# Patient Record
Sex: MALE | Race: WHITE | NOT HISPANIC OR LATINO | Employment: OTHER | URBAN - NONMETROPOLITAN AREA
[De-identification: names, ages, dates, MRNs, and addresses within clinical notes are randomized per-mention and may not be internally consistent; named-entity substitution may affect disease eponyms.]

---

## 2024-05-15 ENCOUNTER — OFFICE VISIT (OUTPATIENT)
Dept: CARDIOLOGY | Facility: CLINIC | Age: 69
End: 2024-05-15
Payer: MEDICARE

## 2024-05-15 VITALS
HEIGHT: 67 IN | BODY MASS INDEX: 32.11 KG/M2 | HEART RATE: 84 BPM | DIASTOLIC BLOOD PRESSURE: 65 MMHG | WEIGHT: 204.6 LBS | SYSTOLIC BLOOD PRESSURE: 135 MMHG | OXYGEN SATURATION: 97 %

## 2024-05-15 DIAGNOSIS — I25.10 CORONARY ARTERY DISEASE INVOLVING NATIVE CORONARY ARTERY OF NATIVE HEART WITHOUT ANGINA PECTORIS: ICD-10-CM

## 2024-05-15 DIAGNOSIS — R06.02 SHORTNESS OF BREATH: Primary | ICD-10-CM

## 2024-05-15 DIAGNOSIS — E78.2 MIXED HYPERLIPIDEMIA: ICD-10-CM

## 2024-05-15 PROCEDURE — 1159F MED LIST DOCD IN RCRD: CPT | Performed by: NURSE PRACTITIONER

## 2024-05-15 PROCEDURE — 99204 OFFICE O/P NEW MOD 45 MIN: CPT | Performed by: NURSE PRACTITIONER

## 2024-05-15 PROCEDURE — 93000 ELECTROCARDIOGRAM COMPLETE: CPT | Performed by: NURSE PRACTITIONER

## 2024-05-15 PROCEDURE — 1160F RVW MEDS BY RX/DR IN RCRD: CPT | Performed by: NURSE PRACTITIONER

## 2024-05-15 RX ORDER — BUPROPION HYDROCHLORIDE 300 MG/1
300 TABLET ORAL DAILY
COMMUNITY

## 2024-05-15 RX ORDER — ROSUVASTATIN CALCIUM 20 MG/1
20 TABLET, COATED ORAL DAILY
COMMUNITY

## 2024-05-15 RX ORDER — CELECOXIB 200 MG/1
200 CAPSULE ORAL DAILY
COMMUNITY

## 2024-05-15 RX ORDER — CHLORAL HYDRATE 500 MG
1000 CAPSULE ORAL
COMMUNITY

## 2024-05-15 RX ORDER — ASPIRIN 81 MG/1
81 TABLET ORAL DAILY
COMMUNITY

## 2024-05-15 RX ORDER — FLUTICASONE PROPIONATE 50 MCG
2 SPRAY, SUSPENSION (ML) NASAL DAILY
COMMUNITY

## 2024-05-15 RX ORDER — FAMOTIDINE 20 MG/1
20 TABLET, FILM COATED ORAL DAILY
COMMUNITY

## 2024-05-15 NOTE — PROGRESS NOTES
Subjective     Damon Rodriguez is a 68 y.o. male who presents to day for Coronary Artery Disease and Shortness of Breath.    CHIEF COMPLIANT  Chief Complaint   Patient presents with    Coronary Artery Disease    Shortness of Breath       Active Problems:  Problem List Items Addressed This Visit    None  Visit Diagnoses       Shortness of breath    -  Primary    Relevant Orders    ECG 12 Lead    Adult Transthoracic Echo Complete W/ Cont if Necessary Per Protocol    Coronary artery disease involving native coronary artery of native heart without angina pectoris        Relevant Orders    ECG 12 Lead    Adult Transthoracic Echo Complete W/ Cont if Necessary Per Protocol    Mixed hyperlipidemia        Relevant Medications    rosuvastatin (CRESTOR) 20 MG tablet    Other Relevant Orders    ECG 12 Lead    Adult Transthoracic Echo Complete W/ Cont if Necessary Per Protocol            HPI  HPI  Damon Rodriguez is a 68-year-old male patient is being seen today to establish care for cardiac evaluation.    Patient does have a history of coronary artery disease with the report of 4 different stents.  Is been about 2012 when he had his last workup in [facility] by Dr. Hidalgo which sounds to be a stress test.  He does not report any chest pain at this time for his past. He is on statin therapy of rosuvastatin and antiplatelet therapy of aspirin.    Patient does have shortness of breath that occurs with exertion.  He says walking up his driveway or working on his car does only do short of breath.  He denies any dyspnea at rest or any orthopnea.    Patient does have palpitations with intermittent fluttering and skipping like sensation that occurs in his chest.  He says it happens very infrequently about 1-2 times a month.  May last few seconds per episode.  He says he does notice something then its gone.    Patient does have fatigue in which she is tired all the time.  He has decreased endurance with was not able to do what he used to  be able to do.    Patient does have hyperlipidemia coronary artery disease elevated hemoglobin A1c which does put him at risk for reoccurrence of coronary artery disease.  PRIOR MEDS  Current Outpatient Medications on File Prior to Visit   Medication Sig Dispense Refill    ALBUTEROL SULFATE HFA IN Inhale.      Ascorbic Acid (LORETTA-C PO) Take 500 mg by mouth Daily.      aspirin 81 MG EC tablet Take 1 tablet by mouth Daily.      buPROPion XL (WELLBUTRIN XL) 300 MG 24 hr tablet Take 1 tablet by mouth Daily.      celecoxib (CeleBREX) 200 MG capsule Take 1 capsule by mouth Daily.      Cyanocobalamin (VITAMIN B 12 PO) Take 1,000 mcg by mouth Daily.      famotidine (PEPCID) 20 MG tablet Take 1 tablet by mouth Daily.      fluticasone (FLONASE) 50 MCG/ACT nasal spray 2 sprays into the nostril(s) as directed by provider Daily.      Glucosamine-Chondroit-Vit C-Mn (GLUCOSAMINE 1500 COMPLEX PO) Take  by mouth.      Omega-3 Fatty Acids (fish oil) 1000 MG capsule capsule Take 1 capsule by mouth Daily With Breakfast.      rosuvastatin (CRESTOR) 20 MG tablet Take 1 tablet by mouth Daily.       No current facility-administered medications on file prior to visit.       ALLERGIES  Patient has no known allergies.    HISTORY  Past Medical History:   Diagnosis Date    Arthritis     Compression fracture of lumbar spine, non-traumatic     Depression     GERD (gastroesophageal reflux disease)     History of stomach ulcers     Ruptured lumbar disc        Social History     Socioeconomic History    Marital status: Single   Tobacco Use    Smoking status: Every Day     Types: Cigarettes    Smokeless tobacco: Never   Vaping Use    Vaping status: Never Used   Substance and Sexual Activity    Alcohol use: Yes     Comment: beer and bourbon    Drug use: Never    Sexual activity: Defer       Family History   Problem Relation Age of Onset    Cervical cancer Mother     Hydrocephalus Mother     Heart disease Father         mitral valve replacement     "Stroke Father     Atrial fibrillation Father     Pancreatic cancer Sister         blood  clots    Diabetes Sister        Review of Systems   Constitutional:  Positive for fatigue. Negative for chills and fever.   HENT:  Positive for congestion, rhinorrhea and sore throat.    Eyes:  Positive for visual disturbance (cataract sx).   Respiratory:  Positive for apnea (CPAP does not use regularly) and shortness of breath (with exertion). Negative for chest tightness.    Cardiovascular:  Positive for palpitations (has a sensation in chest feels it could be PVC's). Negative for chest pain and leg swelling.   Gastrointestinal:  Negative for constipation, diarrhea and nausea.   Musculoskeletal:  Positive for arthralgias, back pain and neck pain.   Allergic/Immunologic: Positive for environmental allergies. Negative for food allergies.   Neurological:  Positive for dizziness (getting up to fast) and light-headedness. Negative for syncope and weakness.   Hematological:  Does not bruise/bleed easily.   Psychiatric/Behavioral:  Negative for sleep disturbance.        Objective     VITALS: /65 (BP Location: Left arm, Patient Position: Sitting, Cuff Size: Adult)   Pulse 84   Ht 170.2 cm (67\")   Wt 92.8 kg (204 lb 9.6 oz)   SpO2 97%   BMI 32.04 kg/m²     LABS:   Lab Results (most recent)       None            IMAGING:   No Images in the past 120 days found..    EXAM:  Physical Exam  Vitals and nursing note reviewed.   Constitutional:       Appearance: He is well-developed.   HENT:      Head: Normocephalic.   Neck:      Thyroid: No thyroid mass.      Vascular: No carotid bruit or JVD.      Trachea: Trachea and phonation normal.   Cardiovascular:      Rate and Rhythm: Normal rate and regular rhythm.      Pulses:           Radial pulses are 2+ on the right side and 2+ on the left side.        Posterior tibial pulses are 2+ on the right side and 2+ on the left side.      Heart sounds: Normal heart sounds. No murmur heard.     " No friction rub. No gallop.   Pulmonary:      Effort: Pulmonary effort is normal. No respiratory distress.      Breath sounds: Normal breath sounds. No wheezing or rales.   Musculoskeletal:         General: No swelling. Normal range of motion.      Cervical back: Neck supple.   Skin:     General: Skin is warm and dry.      Capillary Refill: Capillary refill takes less than 2 seconds.      Findings: No rash.   Neurological:      Mental Status: He is alert and oriented to person, place, and time.   Psychiatric:         Speech: Speech normal.         Behavior: Behavior normal.         Thought Content: Thought content normal.         Judgment: Judgment normal.         Procedure     ECG 12 Lead    Date/Time: 5/15/2024 2:33 PM  Performed by: Vishal Russell APRN    Authorized by: Vishal Russell APRN  Previous ECG: no previous ECG available  Rhythm: sinus arrhythmia  Rate: normal  BPM: 72  QRS axis: left  Other findings: non-specific ST-T wave changes  Comments: Qtc 381 ms  No acute changes               Assessment & Plan    Diagnosis Plan   1. Shortness of breath  ECG 12 Lead    Adult Transthoracic Echo Complete W/ Cont if Necessary Per Protocol      2. Coronary artery disease involving native coronary artery of native heart without angina pectoris  ECG 12 Lead    Adult Transthoracic Echo Complete W/ Cont if Necessary Per Protocol      3. Mixed hyperlipidemia  ECG 12 Lead    Adult Transthoracic Echo Complete W/ Cont if Necessary Per Protocol      1.  Patient does have a history of coronary artery disease with some shortness of breath that seems to be stable.  Due to the longevity of his last workup being 12 years ago I would like to get an echocardiogram to reestablish a baseline.  2.  Patient does have shortness of breath with seems to be stable we will follow-up with an echocardiogram.  If any abnormalities we will move forth a stress test.  3.  We did review patient's labs in detail that was provided by his PCP all  questions were answered.  4.  Patient does have palpitations which only occur 1-2 times a month.  He does have a sinus arrhythmia on his EKG.  If he does start having more palpitations we will have him wear a cardiac event monitor.  5.  Informed of signs and symptoms of ACS and advised to seek emergent treatment for any new worsening symptoms.  Patient also advised sooner follow-up as needed.  Also advised to follow-up with family doctor as needed  This note is dictated utilizing voice recognition software.  Although this record has been proof read, transcriptional errors may still be present. If questions occur regarding the content of this record please do not hesitate to call our office.  I have reviewed and confirmed the accuracy of the ROS as documented by the MA/LPN/RN MACI Carpenter    Return if symptoms worsen or fail to improve, for Next scheduled follow up.    Diagnoses and all orders for this visit:    1. Shortness of breath (Primary)  -     ECG 12 Lead  -     Adult Transthoracic Echo Complete W/ Cont if Necessary Per Protocol; Future    2. Coronary artery disease involving native coronary artery of native heart without angina pectoris  -     ECG 12 Lead  -     Adult Transthoracic Echo Complete W/ Cont if Necessary Per Protocol; Future    3. Mixed hyperlipidemia  -     ECG 12 Lead  -     Adult Transthoracic Echo Complete W/ Cont if Necessary Per Protocol; Future        Damon Rodriguez  reports that he has been smoking cigarettes. He has never used smokeless tobacco. I have educated him on the risk of diseases from using tobacco products such as cancer, COPD, and heart disease.     I advised him to quit and he is not willing to quit.    I spent 5.5 minutes counseling the patient.           BMI is >= 30 and <35. (Class 1 Obesity). The following options were offered after discussion;: exercise counseling/recommendations and nutrition counseling/recommendations    Advance Care Planning   ACP discussion  was held with the patient during this visit. Patient has an advance directive (not in EMR), copy requested.               MEDS ORDERED DURING VISIT:  No orders of the defined types were placed in this encounter.          This document has been electronically signed by Vishal Russell Jr., APRN  May 15, 2024 15:02 EDT

## 2024-06-18 ENCOUNTER — HOSPITAL ENCOUNTER (OUTPATIENT)
Dept: CARDIOLOGY | Facility: HOSPITAL | Age: 69
Discharge: HOME | End: 2024-06-18
Payer: MEDICARE

## 2024-06-18 DIAGNOSIS — E78.2 MIXED HYPERLIPIDEMIA: ICD-10-CM

## 2024-06-18 DIAGNOSIS — R06.02 SHORTNESS OF BREATH: ICD-10-CM

## 2024-06-18 DIAGNOSIS — I25.10 CORONARY ARTERY DISEASE INVOLVING NATIVE CORONARY ARTERY OF NATIVE HEART WITHOUT ANGINA PECTORIS: ICD-10-CM

## 2024-06-18 LAB
AORTIC DIMENSIONLESS INDEX: 0.9 (DI)
BH CV ECHO MEAS - ACS: 2.4 CM
BH CV ECHO MEAS - AO MAX PG: 6.7 MMHG
BH CV ECHO MEAS - AO MEAN PG: 4 MMHG
BH CV ECHO MEAS - AO ROOT DIAM: 3.8 CM
BH CV ECHO MEAS - AO V2 MAX: 129 CM/SEC
BH CV ECHO MEAS - AO V2 VTI: 20.3 CM
BH CV ECHO MEAS - AVA(I,D): 3.2 CM2
BH CV ECHO MEAS - EDV(CUBED): 69.9 ML
BH CV ECHO MEAS - EDV(MOD-SP4): 84.2 ML
BH CV ECHO MEAS - EF(MOD-SP4): 58.7 %
BH CV ECHO MEAS - ESV(CUBED): 9.8 ML
BH CV ECHO MEAS - ESV(MOD-SP4): 34.8 ML
BH CV ECHO MEAS - FS: 48.1 %
BH CV ECHO MEAS - IVS/LVPW: 0.71 CM
BH CV ECHO MEAS - IVSD: 1.02 CM
BH CV ECHO MEAS - LA A2CS (ATRIAL LENGTH): 4.5 CM
BH CV ECHO MEAS - LA A4C LENGTH: 4.4 CM
BH CV ECHO MEAS - LA DIMENSION: 4.2 CM
BH CV ECHO MEAS - LAT PEAK E' VEL: 9.1 CM/SEC
BH CV ECHO MEAS - LV DIASTOLIC VOL/BSA (35-75): 41.3 CM2
BH CV ECHO MEAS - LV MASS(C)D: 179.4 GRAMS
BH CV ECHO MEAS - LV MAX PG: 4.9 MMHG
BH CV ECHO MEAS - LV MEAN PG: 2 MMHG
BH CV ECHO MEAS - LV SYSTOLIC VOL/BSA (12-30): 17.1 CM2
BH CV ECHO MEAS - LV V1 MAX: 111 CM/SEC
BH CV ECHO MEAS - LV V1 VTI: 17.3 CM
BH CV ECHO MEAS - LVIDD: 4.1 CM
BH CV ECHO MEAS - LVIDS: 2.14 CM
BH CV ECHO MEAS - LVOT AREA: 3.8 CM2
BH CV ECHO MEAS - LVOT DIAM: 2.2 CM
BH CV ECHO MEAS - LVPWD: 1.44 CM
BH CV ECHO MEAS - MED PEAK E' VEL: 9.1 CM/SEC
BH CV ECHO MEAS - MV A MAX VEL: 94.7 CM/SEC
BH CV ECHO MEAS - MV DEC SLOPE: 432 CM/SEC2
BH CV ECHO MEAS - MV DEC TIME: 0.25 SEC
BH CV ECHO MEAS - MV E MAX VEL: 67.5 CM/SEC
BH CV ECHO MEAS - MV E/A: 0.71
BH CV ECHO MEAS - MV MAX PG: 5.2 MMHG
BH CV ECHO MEAS - MV MEAN PG: 2 MMHG
BH CV ECHO MEAS - MV P1/2T: 53.4 MSEC
BH CV ECHO MEAS - MV V2 VTI: 23.4 CM
BH CV ECHO MEAS - MVA(P1/2T): 4.1 CM2
BH CV ECHO MEAS - MVA(VTI): 2.8 CM2
BH CV ECHO MEAS - RAP SYSTOLE: 10 MMHG
BH CV ECHO MEAS - RVDD: 3.1 CM
BH CV ECHO MEAS - RVSP: 33.6 MMHG
BH CV ECHO MEAS - SV(LVOT): 65.8 ML
BH CV ECHO MEAS - SV(MOD-SP4): 49.4 ML
BH CV ECHO MEAS - SVI(LVOT): 32.2 ML/M2
BH CV ECHO MEAS - SVI(MOD-SP4): 24.2 ML/M2
BH CV ECHO MEAS - TR MAX PG: 23.6 MMHG
BH CV ECHO MEAS - TR MAX VEL: 243 CM/SEC
BH CV ECHO MEASUREMENTS AVERAGE E/E' RATIO: 7.42
LEFT ATRIUM VOLUME INDEX: 10.3 ML/M2
LEFT ATRIUM VOLUME: 21 ML

## 2024-06-18 PROCEDURE — 93306 TTE W/DOPPLER COMPLETE: CPT

## 2024-06-30 LAB
AORTIC DIMENSIONLESS INDEX: 0.9 (DI)
BH CV ECHO MEAS - ACS: 2.4 CM
BH CV ECHO MEAS - AO MAX PG: 6.7 MMHG
BH CV ECHO MEAS - AO MEAN PG: 4 MMHG
BH CV ECHO MEAS - AO ROOT DIAM: 3.6 CM
BH CV ECHO MEAS - AO V2 MAX: 129 CM/SEC
BH CV ECHO MEAS - AO V2 VTI: 20.3 CM
BH CV ECHO MEAS - AVA(I,D): 3.2 CM2
BH CV ECHO MEAS - EDV(CUBED): 69.9 ML
BH CV ECHO MEAS - EDV(MOD-SP4): 84.2 ML
BH CV ECHO MEAS - EF(MOD-SP4): 58.7 %
BH CV ECHO MEAS - ESV(CUBED): 9.8 ML
BH CV ECHO MEAS - ESV(MOD-SP4): 34.8 ML
BH CV ECHO MEAS - FS: 48.1 %
BH CV ECHO MEAS - IVS/LVPW: 0.71 CM
BH CV ECHO MEAS - IVSD: 1.02 CM
BH CV ECHO MEAS - LA A2CS (ATRIAL LENGTH): 4.5 CM
BH CV ECHO MEAS - LA A4C LENGTH: 4.4 CM
BH CV ECHO MEAS - LA DIMENSION: 4.2 CM
BH CV ECHO MEAS - LAT PEAK E' VEL: 9.1 CM/SEC
BH CV ECHO MEAS - LV DIASTOLIC VOL/BSA (35-75): 41.3 CM2
BH CV ECHO MEAS - LV MASS(C)D: 179.4 GRAMS
BH CV ECHO MEAS - LV MAX PG: 4.9 MMHG
BH CV ECHO MEAS - LV MEAN PG: 2 MMHG
BH CV ECHO MEAS - LV SYSTOLIC VOL/BSA (12-30): 17.1 CM2
BH CV ECHO MEAS - LV V1 MAX: 111 CM/SEC
BH CV ECHO MEAS - LV V1 VTI: 17.3 CM
BH CV ECHO MEAS - LVIDD: 4.1 CM
BH CV ECHO MEAS - LVIDS: 2.14 CM
BH CV ECHO MEAS - LVOT AREA: 3.8 CM2
BH CV ECHO MEAS - LVOT DIAM: 2.2 CM
BH CV ECHO MEAS - LVPWD: 1.44 CM
BH CV ECHO MEAS - MED PEAK E' VEL: 9.1 CM/SEC
BH CV ECHO MEAS - MV A MAX VEL: 94.7 CM/SEC
BH CV ECHO MEAS - MV DEC SLOPE: 432 CM/SEC2
BH CV ECHO MEAS - MV DEC TIME: 0.25 SEC
BH CV ECHO MEAS - MV E MAX VEL: 67.5 CM/SEC
BH CV ECHO MEAS - MV E/A: 0.71
BH CV ECHO MEAS - MV MAX PG: 5.2 MMHG
BH CV ECHO MEAS - MV MEAN PG: 2 MMHG
BH CV ECHO MEAS - MV P1/2T: 53.4 MSEC
BH CV ECHO MEAS - MV V2 VTI: 23.4 CM
BH CV ECHO MEAS - MVA(P1/2T): 4.1 CM2
BH CV ECHO MEAS - MVA(VTI): 2.8 CM2
BH CV ECHO MEAS - RAP SYSTOLE: 10 MMHG
BH CV ECHO MEAS - RVDD: 3.1 CM
BH CV ECHO MEAS - RVSP: 33.6 MMHG
BH CV ECHO MEAS - SV(LVOT): 65.8 ML
BH CV ECHO MEAS - SV(MOD-SP4): 49.4 ML
BH CV ECHO MEAS - SVI(LVOT): 32.2 ML/M2
BH CV ECHO MEAS - SVI(MOD-SP4): 24.2 ML/M2
BH CV ECHO MEAS - TR MAX PG: 23.6 MMHG
BH CV ECHO MEAS - TR MAX VEL: 243 CM/SEC
BH CV ECHO MEASUREMENTS AVERAGE E/E' RATIO: 7.42
LEFT ATRIUM VOLUME INDEX: 10.3 ML/M2
LEFT ATRIUM VOLUME: 21 ML

## 2024-07-02 ENCOUNTER — TELEPHONE (OUTPATIENT)
Dept: CARDIOLOGY | Facility: CLINIC | Age: 69
End: 2024-07-02
Payer: MEDICARE

## 2024-07-02 NOTE — TELEPHONE ENCOUNTER
For the HUB to read to pt:     ECHO  Called patient to notify of no acute findings or abnormalities. Keep follow up as scheduled. If you have any problem between now and then give our office a call.   ----- Message from Armando Cherry sent at 7/2/2024  8:49 AM EDT -----  Follow-up with

## 2024-11-20 ENCOUNTER — OFFICE VISIT (OUTPATIENT)
Dept: CARDIOLOGY | Facility: CLINIC | Age: 69
End: 2024-11-20
Payer: MEDICARE

## 2024-11-20 VITALS
HEIGHT: 67 IN | SYSTOLIC BLOOD PRESSURE: 116 MMHG | BODY MASS INDEX: 30.07 KG/M2 | HEART RATE: 78 BPM | OXYGEN SATURATION: 97 % | DIASTOLIC BLOOD PRESSURE: 77 MMHG | WEIGHT: 191.6 LBS

## 2024-11-20 DIAGNOSIS — R06.02 SHORTNESS OF BREATH: Primary | ICD-10-CM

## 2024-11-20 DIAGNOSIS — R00.2 PALPITATIONS: ICD-10-CM

## 2024-11-20 DIAGNOSIS — E78.2 MIXED HYPERLIPIDEMIA: ICD-10-CM

## 2024-11-20 DIAGNOSIS — I25.10 CORONARY ARTERY DISEASE INVOLVING NATIVE CORONARY ARTERY OF NATIVE HEART WITHOUT ANGINA PECTORIS: ICD-10-CM

## 2024-11-20 PROCEDURE — 1160F RVW MEDS BY RX/DR IN RCRD: CPT | Performed by: NURSE PRACTITIONER

## 2024-11-20 PROCEDURE — 1159F MED LIST DOCD IN RCRD: CPT | Performed by: NURSE PRACTITIONER

## 2024-11-20 PROCEDURE — 99214 OFFICE O/P EST MOD 30 MIN: CPT | Performed by: NURSE PRACTITIONER

## 2024-11-20 RX ORDER — LANOLIN ALCOHOL/MO/W.PET/CERES
50 CREAM (GRAM) TOPICAL DAILY
COMMUNITY

## 2024-11-20 NOTE — PROGRESS NOTES
Subjective     Damon Rodriguez is a 69 y.o. male who presents to day for 6 month follow up  and Shortness of Breath.    CHIEF COMPLIANT  Chief Complaint   Patient presents with    6 month follow up     Shortness of Breath       Active Problems:  Problem List Items Addressed This Visit    None  Visit Diagnoses       Shortness of breath    -  Primary    Relevant Orders    Holter Monitor - 72 Hour Up To 15 Days    Coronary artery disease involving native coronary artery of native heart without angina pectoris        Relevant Orders    Holter Monitor - 72 Hour Up To 15 Days    Mixed hyperlipidemia        Relevant Orders    Holter Monitor - 72 Hour Up To 15 Days    Palpitations        Relevant Orders    Holter Monitor - 72 Hour Up To 15 Days        Problem list  1.  Shortness of breath  1.1 echocardiogram 6/24: EF 45 to 50%, grade 1 diastolic dysfunction, trivial MR TR and AI  2.  History of coronary artery disease  3.  Mixed hyperlipidemia    HPI  HPI  emely Rodriguez is a 69-year-old male patient is being seen today for follow-up for coronary artery disease and testing results.       Patient does have a history of coronary artery disease with the report of 4 different stents.  Is been about 2012 when he had his last workup in [facility] by Dr. Hidalgo which sounds to be a stress test.  He does not report any chest pain at this time for his past. He is on statin therapy of rosuvastatin and antiplatelet therapy of aspirin.    Patient did go under echocardiogram that showed his ejection fraction was 45 to 50%, grade 1 diastolic dysfunction, trivial MR TR and AI.  RV systolic pressures estimated approximately 20 mmHg.  We did discuss this in detail    Patient does have palpitations that and occur intermittently in his chest.  He says that it feels like an irregular heartbeat.  He is not overly symptomatic with this but considering the fact that his dad has A-fib this is concerning to him.    Patient denies any chest pain,  shortness of breath, lower extremity edema, fatigue, syncope, PND, orthopnea, or strokelike symptoms.  PRIOR MEDS  Current Outpatient Medications on File Prior to Visit   Medication Sig Dispense Refill    ALBUTEROL SULFATE HFA IN Inhale.      Ascorbic Acid (LORETTA-C PO) Take 500 mg by mouth Daily.      aspirin 81 MG EC tablet Take 1 tablet by mouth Daily.      buPROPion XL (WELLBUTRIN XL) 300 MG 24 hr tablet Take 1 tablet by mouth Daily.      CALCIUM PO Take 500 mg by mouth Daily.      celecoxib (CeleBREX) 200 MG capsule Take 1 capsule by mouth Daily.      Cholecalciferol (VITAMIN D-3 PO) Take 20 mcg by mouth Daily.      Cyanocobalamin (VITAMIN B 12 PO) Take 1,000 mcg by mouth Daily.      famotidine (PEPCID) 20 MG tablet Take 1 tablet by mouth Daily.      Glucosamine-Chondroit-Vit C-Mn (GLUCOSAMINE 1500 COMPLEX PO) Take  by mouth.      Omega-3 Fatty Acids (fish oil) 1000 MG capsule capsule Take 1 capsule by mouth Daily With Breakfast.      rosuvastatin (CRESTOR) 20 MG tablet Take 1 tablet by mouth Daily.      vitamin B-6 (PYRIDOXINE) 50 MG tablet Take 1 tablet by mouth Daily.      [DISCONTINUED] fluticasone (FLONASE) 50 MCG/ACT nasal spray Administer 2 sprays into the nostril(s) as directed by provider Daily.       No current facility-administered medications on file prior to visit.       ALLERGIES  Patient has no known allergies.    HISTORY  Past Medical History:   Diagnosis Date    Arthritis     Compression fracture of lumbar spine, non-traumatic     Depression     GERD (gastroesophageal reflux disease)     History of stomach ulcers     Ruptured lumbar disc        Social History     Socioeconomic History    Marital status: Single   Tobacco Use    Smoking status: Every Day     Types: Cigarettes    Smokeless tobacco: Never   Vaping Use    Vaping status: Never Used   Substance and Sexual Activity    Alcohol use: Yes     Comment: beer and bourbon    Drug use: Never    Sexual activity: Defer       Family History  "  Problem Relation Age of Onset    Cervical cancer Mother     Hydrocephalus Mother     Heart disease Father         mitral valve replacement    Stroke Father     Atrial fibrillation Father     Pancreatic cancer Sister         blood  clots    Diabetes Sister        Review of Systems   Constitutional:  Positive for fatigue and fever (off and on). Negative for chills.   HENT:  Negative for congestion, rhinorrhea and sore throat.    Eyes:  Negative for visual disturbance.   Respiratory:  Positive for apnea (CPAP does not use lately). Negative for chest tightness and shortness of breath.    Cardiovascular:  Positive for palpitations (pulse is irregular). Negative for chest pain and leg swelling.   Gastrointestinal:  Negative for constipation, diarrhea and nausea.   Musculoskeletal:  Positive for arthralgias, back pain and neck pain.   Skin:  Positive for rash (in March had yeast). Negative for wound.   Allergic/Immunologic: Positive for environmental allergies. Negative for food allergies.   Neurological:  Positive for dizziness (Getting up to quickly), weakness and light-headedness. Negative for syncope.   Hematological:  Bruises/bleeds easily.   Psychiatric/Behavioral:  Negative for sleep disturbance.        Objective     VITALS: /77 (BP Location: Right arm, Patient Position: Sitting, Cuff Size: Adult)   Pulse 78   Ht 170.2 cm (67.01\")   Wt 86.9 kg (191 lb 9.6 oz)   SpO2 97%   BMI 30.00 kg/m²     LABS:   Lab Results (most recent)       None            IMAGING:   No Images in the past 120 days found..    EXAM:  Physical Exam    Procedure   Procedures       Assessment & Plan    Diagnosis Plan   1. Shortness of breath  Holter Monitor - 72 Hour Up To 15 Days      2. Coronary artery disease involving native coronary artery of native heart without angina pectoris  Holter Monitor - 72 Hour Up To 15 Days      3. Mixed hyperlipidemia  Holter Monitor - 72 Hour Up To 15 Days      4. Palpitations  Holter Monitor - 72 " Hour Up To 15 Days      1.  Patient does have a remote history of coronary artery disease in which insufficient records are available to us.  We did repeat an echocardiogram that showed a mildly reduced ejection fraction 45 to 50%.  We did discuss medication management which she wishes to defer at this time.  A joint decision was to repeat echocardiogram in 3 to 6 months to reevaluate his ejection fraction.    2.  Patient does have hyperlipidemia which we will continue the rosuvastatin as well as omega-3 fatty acids.  He did have an increased triglycerides.  His LDL is within normal range.  These will be scanned into the chart for further reference.    3.  Patient does have palpitations in which he is concerned that it may be A-fib given the fact that his father had atrial fibrillation.  We did discuss this in detail and he wishes to wear a Holter monitor for 14 days to help determine the etiology of his palpitations.  4.  Informed of signs and symptoms of ACS and advised to seek emergent treatment for any new worsening symptoms.  Patient also advised sooner follow-up as needed.  Also advised to follow-up with family doctor as needed  This note is dictated utilizing voice recognition software.  Although this record has been proof read, transcriptional errors may still be present. If questions occur regarding the content of this record please do not hesitate to call our office.  I have reviewed and confirmed the accuracy of the ROS as documented by the MA/LEVON/RN MACI Carpenter    Return if symptoms worsen or fail to improve, for Next scheduled follow up.    Diagnoses and all orders for this visit:    1. Shortness of breath (Primary)  -     Holter Monitor - 72 Hour Up To 15 Days; Future    2. Coronary artery disease involving native coronary artery of native heart without angina pectoris  -     Holter Monitor - 72 Hour Up To 15 Days; Future    3. Mixed hyperlipidemia  -     Holter Monitor - 72 Hour Up To 15  Days; Future    4. Palpitations  -     Holter Monitor - 72 Hour Up To 15 Days; Future        Damon Rodriguez  reports that he has been smoking cigarettes. He has never used smokeless tobacco. I have educated him on the risk of diseases from using tobacco products such as cancer, COPD, and heart disease.     I advised him to quit and he is not willing to quit.    I spent 5.5 minutes counseling the patient.    Advance Care Planning   ACP discussion was held with the patient during this visit. Patient has an advance directive (not in EMR), copy requested.                           MEDS ORDERED DURING VISIT:  No orders of the defined types were placed in this encounter.          This document has been electronically signed by Vishal Russell Jr., APRN  November 20, 2024 12:41 EST

## 2025-01-20 ENCOUNTER — TELEPHONE (OUTPATIENT)
Dept: CARDIOLOGY | Facility: CLINIC | Age: 70
End: 2025-01-20
Payer: MEDICARE

## 2025-01-20 NOTE — TELEPHONE ENCOUNTER
I called patient and went over monitor report as follows:    1.  Symptoms: No reported symptoms  2.  Predominant rhythm: Sinus  3.  Noted arrhythmias:  -4 runs of SVT with the fastest episode being 137 bpm and the longest being 3 beats at 134 bpm  -3% PAC burden  -Less than 1% PVC burden    Keep follow up